# Patient Record
Sex: FEMALE | Race: WHITE
[De-identification: names, ages, dates, MRNs, and addresses within clinical notes are randomized per-mention and may not be internally consistent; named-entity substitution may affect disease eponyms.]

---

## 2017-11-27 ENCOUNTER — HOSPITAL ENCOUNTER (INPATIENT)
Dept: HOSPITAL 88 - ER | Age: 67
LOS: 4 days | Discharge: HOME | DRG: 69 | End: 2017-12-01
Attending: INTERNAL MEDICINE | Admitting: INTERNAL MEDICINE
Payer: MEDICARE

## 2017-11-27 VITALS — WEIGHT: 159.5 LBS | BODY MASS INDEX: 27.23 KG/M2 | HEIGHT: 64 IN

## 2017-11-27 DIAGNOSIS — G45.9: Primary | ICD-10-CM

## 2017-11-27 DIAGNOSIS — G45.3: ICD-10-CM

## 2017-11-27 DIAGNOSIS — N30.00: ICD-10-CM

## 2017-11-27 DIAGNOSIS — I25.10: ICD-10-CM

## 2017-11-27 DIAGNOSIS — Z86.73: ICD-10-CM

## 2017-11-27 DIAGNOSIS — E78.5: ICD-10-CM

## 2017-11-27 DIAGNOSIS — Z79.01: ICD-10-CM

## 2017-11-27 DIAGNOSIS — I10: ICD-10-CM

## 2017-11-27 DIAGNOSIS — I48.91: ICD-10-CM

## 2017-11-27 LAB
ALBUMIN SERPL-MCNC: 4.1 G/DL (ref 3.5–5)
ALBUMIN/GLOB SERPL: 1.1 {RATIO} (ref 0.8–2)
ALP SERPL-CCNC: 57 IU/L (ref 40–150)
ALT SERPL-CCNC: 22 IU/L (ref 0–55)
ANION GAP SERPL CALC-SCNC: 12.4 MMOL/L (ref 8–16)
BACTERIA URNS QL MICRO: (no result) /HPF
BASOPHILS # BLD AUTO: 0.1 10*3/UL (ref 0–0.1)
BASOPHILS NFR BLD AUTO: 0.8 % (ref 0–1)
BILIRUB UR QL: NEGATIVE
BUN SERPL-MCNC: 14 MG/DL (ref 7–26)
BUN/CREAT SERPL: 14 (ref 6–25)
CALCIUM SERPL-MCNC: 9.6 MG/DL (ref 8.4–10.2)
CHLORIDE SERPL-SCNC: 102 MMOL/L (ref 98–107)
CK MB SERPL-MCNC: 1.2 NG/ML (ref 0–5)
CK SERPL-CCNC: 83 IU/L (ref 29–168)
CLARITY UR: CLEAR
CO2 SERPL-SCNC: 28 MMOL/L (ref 22–29)
COLOR UR: YELLOW
DEPRECATED APTT PLAS QN: 29.4 SECONDS (ref 23.8–35.5)
DEPRECATED INR PLAS: 1.67
DEPRECATED NEUTROPHILS # BLD AUTO: 4.6 10*3/UL (ref 2.1–6.9)
DEPRECATED RBC URNS MANUAL-ACNC: (no result) /HPF (ref 0–5)
EGFRCR SERPLBLD CKD-EPI 2021: 57 ML/MIN (ref 60–?)
EOSINOPHIL # BLD AUTO: 0.5 10*3/UL (ref 0–0.4)
EOSINOPHIL NFR BLD AUTO: 6.2 % (ref 0–6)
EPI CELLS URNS QL MICRO: (no result) /LPF
ERYTHROCYTE [DISTWIDTH] IN CORD BLOOD: 13.6 % (ref 11.7–14.4)
GLOBULIN PLAS-MCNC: 3.6 G/DL (ref 2.3–3.5)
GLUCOSE SERPLBLD-MCNC: 104 MG/DL (ref 74–118)
HCT VFR BLD AUTO: 41.3 % (ref 34.2–44.1)
HGB BLD-MCNC: 13.8 G/DL (ref 12–16)
KETONES UR QL STRIP.AUTO: NEGATIVE
LEUKOCYTE ESTERASE UR QL STRIP.AUTO: (no result)
LYMPHOCYTES # BLD: 2.3 10*3/UL (ref 1–3.2)
LYMPHOCYTES NFR BLD AUTO: 27.3 % (ref 18–39.1)
MCH RBC QN AUTO: 32.1 PG (ref 28–32)
MCHC RBC AUTO-ENTMCNC: 33.4 G/DL (ref 31–35)
MCV RBC AUTO: 96 FL (ref 81–99)
MONOCYTES # BLD AUTO: 0.9 10*3/UL (ref 0.2–0.8)
MONOCYTES NFR BLD AUTO: 10.4 % (ref 4.4–11.3)
NEUTS SEG NFR BLD AUTO: 54.3 % (ref 38.7–80)
NITRITE UR QL STRIP.AUTO: NEGATIVE
PLATELET # BLD AUTO: 162 X10E3/UL (ref 140–360)
POTASSIUM SERPL-SCNC: 3.4 MMOL/L (ref 3.5–5.1)
PROT UR QL STRIP.AUTO: (no result)
PROTHROMBIN TIME: 20.6 SECONDS (ref 11.9–14.5)
RBC # BLD AUTO: 4.3 X10E6/UL (ref 3.6–5.1)
SODIUM SERPL-SCNC: 139 MMOL/L (ref 136–145)
SP GR UR STRIP: 1.01 (ref 1.01–1.02)
TROPONIN I SERPL DL<=0.01 NG/ML-MCNC: 0 NG/ML (ref 0–0.3)
UROBILINOGEN UR STRIP-MCNC: 0.2 MG/DL (ref 0.2–1)

## 2017-11-27 PROCEDURE — 71010: CPT

## 2017-11-27 PROCEDURE — 82553 CREATINE MB FRACTION: CPT

## 2017-11-27 PROCEDURE — 93880 EXTRACRANIAL BILAT STUDY: CPT

## 2017-11-27 PROCEDURE — 80053 COMPREHEN METABOLIC PANEL: CPT

## 2017-11-27 PROCEDURE — 97139 UNLISTED THERAPEUTIC PX: CPT

## 2017-11-27 PROCEDURE — 93005 ELECTROCARDIOGRAM TRACING: CPT

## 2017-11-27 PROCEDURE — 70450 CT HEAD/BRAIN W/O DYE: CPT

## 2017-11-27 PROCEDURE — 85025 COMPLETE CBC W/AUTO DIFF WBC: CPT

## 2017-11-27 PROCEDURE — 84484 ASSAY OF TROPONIN QUANT: CPT

## 2017-11-27 PROCEDURE — 77002 NEEDLE LOCALIZATION BY XRAY: CPT

## 2017-11-27 PROCEDURE — 87086 URINE CULTURE/COLONY COUNT: CPT

## 2017-11-27 PROCEDURE — 80061 LIPID PANEL: CPT

## 2017-11-27 PROCEDURE — 99284 EMERGENCY DEPT VISIT MOD MDM: CPT

## 2017-11-27 PROCEDURE — 85730 THROMBOPLASTIN TIME PARTIAL: CPT

## 2017-11-27 PROCEDURE — 36415 COLL VENOUS BLD VENIPUNCTURE: CPT

## 2017-11-27 PROCEDURE — 85610 PROTHROMBIN TIME: CPT

## 2017-11-27 PROCEDURE — 93458 L HRT ARTERY/VENTRICLE ANGIO: CPT

## 2017-11-27 PROCEDURE — 70551 MRI BRAIN STEM W/O DYE: CPT

## 2017-11-27 PROCEDURE — 82550 ASSAY OF CK (CPK): CPT

## 2017-11-27 PROCEDURE — 36140 INTRO NDL ICATH UPR/LXTR ART: CPT

## 2017-11-27 PROCEDURE — 81001 URINALYSIS AUTO W/SCOPE: CPT

## 2017-11-27 NOTE — DIAGNOSTIC IMAGING REPORT
EXAMINATION: Head CT without contrast.  





HISTORY:Possible stroke, weakness.



COMPARISON:None.

TECHNIQUE: Multidetector axial images were obtained from the foramen magnum to

the vertex without contrast. The images were reconstructed using brain and bone

algorithms.  Thin section brain images were reformatted into coronal and

sagittal planes.  

Intravenous contrast: None  



IMAGE QUALITY: Acceptable.



FINDINGS:

    Skull/scalp: No abnormality.

    Parenchyma: Focal hypodensity in right perirolandic region that extends to

the right parietal centrum semiovale and corona radiata as well as right

periatrial region represents age indeterminate vascular insult.

Nonspecific few, scattered supratentorial white matter hypodensity are likely

related to small vessel ischemic changes.

No acute hemorrhage or mass.

    Arteries: Atherosclerotic calcification in bilateral carotid siphon

    Dural sinuses: No abnormal density suggestive of thrombosis. 

    Ventricles: No hydrocephalus or displacement.

    Extra-axial spaces: No abnormal density.  

    Brain volume: Normal for age.

    Craniocervical junction: No mass, Chiari malformation, or basilar

invagination.

    Sella: No mass. 

    Paranasal/mastoid sinuses: Moderate mucosal thickening in bilateral ethmoid

sinuses.



IMPRESSION:

1.  Age indeterminate vascular insult in right perirolandic region that extends

to the parietal centrum semiovale and corona radiata.



2.  Mild supratentorial white matter microvascular ischemic changes.



Signed by: Dr. Victorina Teixeira M.D. on 11/27/2017 9:30 PM

## 2017-11-27 NOTE — DIAGNOSTIC IMAGING REPORT
EXAMINATION:  CHEST SINGLE (PORTABLE)    



INDICATION:           Weakness 



COMPARISON:  9/10/2015

     

FINDINGS:

TUBES and LINES:  None.



LUNGS:  Lungs are well inflated.  Lungs are clear.   There is no evidence of

pneumonia or pulmonary edema.



PLEURA:  No pleural effusion or pneumothorax.



HEART AND MEDIASTINUM:  The cardiomediastinal silhouette is unremarkable. There

are atherosclerotic calcifications within the aorta.



BONES AND SOFT TISSUES:  No acute osseous lesion. Lower cervical spine anterior

fusion. Soft tissues are remarkable for bilateral surgical clips.



UPPER ABDOMEN: No free air under the diaphragm.    



IMPRESSION: 

No acute thoracic abnormality.





Signed by: Dr. Kenn Medeiros M.D. on 11/27/2017 9:28 PM

## 2017-11-28 VITALS — SYSTOLIC BLOOD PRESSURE: 150 MMHG | DIASTOLIC BLOOD PRESSURE: 70 MMHG

## 2017-11-28 VITALS — SYSTOLIC BLOOD PRESSURE: 150 MMHG | DIASTOLIC BLOOD PRESSURE: 72 MMHG

## 2017-11-28 VITALS — SYSTOLIC BLOOD PRESSURE: 152 MMHG | DIASTOLIC BLOOD PRESSURE: 73 MMHG

## 2017-11-28 VITALS — SYSTOLIC BLOOD PRESSURE: 155 MMHG | DIASTOLIC BLOOD PRESSURE: 75 MMHG

## 2017-11-28 VITALS — DIASTOLIC BLOOD PRESSURE: 75 MMHG | SYSTOLIC BLOOD PRESSURE: 169 MMHG

## 2017-11-28 VITALS — SYSTOLIC BLOOD PRESSURE: 131 MMHG | DIASTOLIC BLOOD PRESSURE: 68 MMHG

## 2017-11-28 RX ADMIN — ASPIRIN 81 MG CHEWABLE TABLET SCH MG: 81 TABLET CHEWABLE at 08:26

## 2017-11-28 RX ADMIN — Medication SCH MG: at 08:26

## 2017-11-28 RX ADMIN — SODIUM CHLORIDE SCH MLS/HR: 9 INJECTION, SOLUTION INTRAVENOUS at 08:26

## 2017-11-28 RX ADMIN — METOPROLOL TARTRATE SCH MG: 50 TABLET, FILM COATED ORAL at 08:27

## 2017-11-28 RX ADMIN — MONTELUKAST SODIUM SCH MG: 10 TABLET, FILM COATED ORAL at 08:27

## 2017-11-28 RX ADMIN — SODIUM CHLORIDE SCH MLS/HR: 9 INJECTION, SOLUTION INTRAVENOUS at 00:34

## 2017-11-28 RX ADMIN — SODIUM CHLORIDE SCH MLS/HR: 9 INJECTION, SOLUTION INTRAVENOUS at 08:52

## 2017-11-28 RX ADMIN — LOSARTAN POTASSIUM SCH MG: 25 TABLET, FILM COATED ORAL at 08:26

## 2017-11-28 RX ADMIN — OXYBUTYNIN CHLORIDE SCH MG: 5 TABLET, FILM COATED, EXTENDED RELEASE ORAL at 08:27

## 2017-11-28 RX ADMIN — METOPROLOL TARTRATE SCH MG: 50 TABLET, FILM COATED ORAL at 16:47

## 2017-11-28 RX ADMIN — PANTOPRAZOLE SODIUM SCH MG: 40 TABLET, DELAYED RELEASE ORAL at 08:27

## 2017-11-28 RX ADMIN — SIMVASTATIN SCH MG: 20 TABLET, FILM COATED ORAL at 21:41

## 2017-11-28 NOTE — DIAGNOSTIC IMAGING REPORT
EXAMINATION: MRI of the brain without contrast.



HISTORY: Evaluate for stroke/TIA, weakness

COMPARISON: Head CT on 11/27/2017

TECHNIQUE: Sagittal T2; axial DWI, T2, FLAIR, T1-IR, T2 gradient echo; coronal

FLAIR. 

IMAGE QUALITY:  Adequate.





FINDINGS:

     Parenchyma: 

1.  Cavitating encephalomalacia in the right parietal corona

radiata-periventricular white matter, with compensatory dilatation of the right

lateral ventricle, likely sequela from remote insult such as infarct, infection

or trauma, correlate with past medical history.

2.   Scattered and mildly confluent periventricular white matter T2

hyperintense foci as well as in the right shayy, most likely nonspecific mild

chronic microvascular ischemic changes.

3.  No mass, hemorrhage, acute or chronic infarcts.



     Skull: Unremarkable.       

     Vessels: Expected flow voids present in the major arteries and dural

sinuses.

     Extra-axial spaces: No abnormal signal intensity or mass effect.

     Brain volume: Within normal limits for age.

     Ventricles: No hydrocephalus or displacement.

     Foramen magnum: Unremarkable.  

     Sella: Unremarkable.  

     Paranasal / mastoid sinuses: Mild mucosal inflammatory thickening of the

bilateral ethmoidal and maxillary sinuses.



IMPRESSION:



1.  No acute infarct.



2.  Right parietal encephalomalacia, likely sequela from remote insult.



3.  Mild chronic microvascular ischemic changes



Signed by: Dr. Meli Arreaga M.D. on 11/28/2017 1:53 PM

## 2017-11-28 NOTE — CONSULTATION
DATE OF CONSULTATION:  November 28, 2017, at 2:30 in the afternoon.



NEUROLOGICAL CONSULTATION



A patient of Dr. Carlos Lutz.



REASON FOR CONSULTATION:  TIA.



HISTORY OF PRESENT ILLNESS:  This is a 67-year-old female who has been 

stable until yesterday. They said in the evening they were sitting _______, 

eating. She was feeling well but then suddenly started feeling like 

generalized weak, like dizzy, like lightheaded and started having some 

blurry vision. All this got worse and she said she could not see anything 

and she felt like she was going to pass out. The whole episode lasted for 

about 15 minutes. She was brought to the hospital emergency room for 

further evaluation and management. 



At the time of this spell, she denies any headache. She denies any 

swallowing difficulty. Also the  says her speech was somewhat 

slurred.



She denied any chest pain, palpitation. No focal paresthesia, no focal 

weakness. Just generalized weakness.



PAST HISTORY:  Hypertension. She had had previous history of stroke, a 

history of atrial fibrillation, coronary artery disease.



SURGERY:  She has a hysterectomy and double mastectomies.



ALLERGIES:  NONE KNOWN. 



SOCIAL HISTORY:  Apparently she does not smoke or drink.



LIST OF MEDICATION SHE HAS BEEN TAKING:  At home she has been taking 

simvastatin 10 mg a day. She is taking Tracey aspirin 81 mg a day. 

Oxybutynin 5 mg daily. Metoprolol tartrate 50 mg a day. Losartan 25 mg a 

day. Flagyl and pantoprazole. The patient has been taking warfarin 5 mg a 

day.



FAMILY HISTORY:  Noncontributory.



REVIEW OF SYSTEMS:  At the present time, all 12 steps negative except what 

is described above.



GENERAL PHYSICAL EXAMINATION

VITAL SIGNS:  Blood pressure is 150/72, pulse 66, temperature is 96.

GENERAL:  She is feeling well. No chest pain, no palpitation, no visual 

disturbance, no headaches, no focal weakness, no focal paresthesia. Patient 

states that in the past she had a stroke several years ago, and that is 

when she started having atrial fibrillation. 

LUNGS:  Clear to auscultation.

HEART:  Regular sinus rhythm. There was no murmur.

ABDOMEN:  Soft. No tenderness. No organomegaly.

MUSCULOSKELETAL:  Lower extremities:  No edema, no cyanosis, no clubbing. 

No back or neck pain.

NEUROLOGIC

Mental status:  At the time of this examination, she is fully alert. She is 

oriented x3. Speech is clear. No dysarthria. No dysphagia. 

Cranial nerves:  Pupils were both equal and reactive. External ocular 

movements were full. No nystagmus. Visual field on confrontation was 

grossly normal. No facial weakness. Tongue protrudes midline. Palatal 

movements normal.

Motor power:  Patient is able to elevate each leg against gravity 

approximately 80 degrees without any difficulty. On the upper extremities, 

there is no weakness in either proximal or distal muscles of both upper 

extremities and both lower extremities also. Flexion of the hip is 5/5. 

Flexion and extension of the knees 5/5. Dorsiflexion of the ankles 5/5. 

Plantarflexion is 5/5. Dorsal extension 5/5. 

Deep tendon reflexes:  Triceps, biceps, radials 1+.  Knee jerks 1+. Ankle 

jerks were absent bilaterally. Plantar stimulation is down bilaterally.

Coordination:  Finger-to-nose is normal.

GAIT:  Deferred.

NECK:  Supple. Normal range of motion without discomfort. Carotid 

pulsations were present bilaterally. There were no carotid bruits, neither 

subclavian bruits bilaterally.



LABORATORY WORKUP:  CBC shows WBC 8400 with a hemoglobin 13.8, hematocrit 

41.3, platelets 162,000. Chemistry:  Sodium 139, potassium 3.4, BUN 14, 

creatinine 0.97, estimated GFR 57, calcium 9.6, glucose 104. Liver enzymes 

are all normal. Troponin is normal.



Urinalysis:  WBC 11-20, protein trace, blood 2+.



Urine culture is pending.



IMPRESSION

1. Acute transient ischemic attack (bilateral amaurosis fugax).

2. Hypertension.

3. Acute cystitis.



RECOMMENDATION:  I reviewed the films for the MRI of the brain and agree 

with the (1) _______ right parietal encephalomalacia probably from previous 

stroke that she mentioned. (2) There is chronic small-vessel disease, 

nothing acute.





Patient is scheduled to have a cardiac cath by Dr. Clements. We will wait for 

those results and will discuss with the patient and with the attending and 

will follow the patient closely.



 





DD:  11/28/2017 15:15

DT:  11/28/2017 17:34

Job#:  [id number]

## 2017-11-28 NOTE — CONSULTATION
DATE OF CONSULTATION:  November 28, 2017 



CARDIOLOGY CONSULTATION



CHIEF COMPLAINT:  Is dizziness.



HISTORY OF PRESENT ILLNESS:  Ms. Bobo is a 67-year-old female with a 

history of atrial fibrillation on warfarin.  She comes in with altered 

mental status and disorientation along with slurred speech which has 

recovered completely.  Her INR at the time of admission was 1.67 and is 

subtherapeutic.



Prior to this admission, she had a stress test done at Dr. Carlos Lutz's 

office which showed some mild inferior ischemia.  Her cardiac enzymes at 

the time of admission currently are normal. 



PAST MEDICAL HISTORY:  As listed above.



SOCIAL HISTORY:  Patient does not smoke or drink.



DRUG ALLERGIES:  LEVOFLOXACIN, MORPHINE.



REVIEW OF SYSTEMS:  Negative except as dictated in the history of present 

illness.

 

PHYSICAL EXAMINATION:  

VITALS:  Afebrile, heart rate 66, blood pressure 150/72, O2 sat 98%. 

CARDIOVASCULAR:  Regular rhythm.  No murmurs or gallops. 

LUNGS:  Clear to auscultation bilaterally. 

ABDOMEN:  Soft.  Bowel sounds heard adequately. 

NEUROLOGIC:  Nonfocal. 





ASSESSMENT:  

1. Transient ischemic attack. 

2. Coronary artery disease with abnormal stress test.



RECOMMENDATION:  MRI of the brain reveals no evidence of acute infarct.  

The patient's warfarin is subtherapeutic.  I had an extended discussion 

with her about maintaining subtherapeutic levels of warfarin.  

Additionally, she has had an abnormal stress test.  Cardiac cath is 

indicated.  This will be performed once her INR is less than 1.3.



I thank Dr. Lutz for this consultation.



 





DD:  11/28/2017 15:40

DT:  11/28/2017 16:16

Job#:  [id number]

## 2017-11-29 VITALS — SYSTOLIC BLOOD PRESSURE: 131 MMHG | DIASTOLIC BLOOD PRESSURE: 60 MMHG

## 2017-11-29 VITALS — SYSTOLIC BLOOD PRESSURE: 189 MMHG | DIASTOLIC BLOOD PRESSURE: 91 MMHG

## 2017-11-29 VITALS — SYSTOLIC BLOOD PRESSURE: 164 MMHG | DIASTOLIC BLOOD PRESSURE: 77 MMHG

## 2017-11-29 VITALS — DIASTOLIC BLOOD PRESSURE: 77 MMHG | SYSTOLIC BLOOD PRESSURE: 159 MMHG

## 2017-11-29 VITALS — DIASTOLIC BLOOD PRESSURE: 76 MMHG | SYSTOLIC BLOOD PRESSURE: 160 MMHG

## 2017-11-29 VITALS — SYSTOLIC BLOOD PRESSURE: 155 MMHG | DIASTOLIC BLOOD PRESSURE: 69 MMHG

## 2017-11-29 LAB
ALBUMIN SERPL-MCNC: 3.4 G/DL (ref 3.5–5)
ALBUMIN/GLOB SERPL: 1.1 {RATIO} (ref 0.8–2)
ALP SERPL-CCNC: 55 IU/L (ref 40–150)
ALT SERPL-CCNC: 19 IU/L (ref 0–55)
ANION GAP SERPL CALC-SCNC: 11.6 MMOL/L (ref 8–16)
BASOPHILS # BLD AUTO: 0 10*3/UL (ref 0–0.1)
BASOPHILS NFR BLD AUTO: 0.6 % (ref 0–1)
BUN SERPL-MCNC: 9 MG/DL (ref 7–26)
BUN/CREAT SERPL: 15 (ref 6–25)
CALCIUM SERPL-MCNC: 9.3 MG/DL (ref 8.4–10.2)
CHLORIDE SERPL-SCNC: 113 MMOL/L (ref 98–107)
CHOLEST SERPL-MCNC: 170 MD/DL (ref 0–199)
CHOLEST/HDLC SERPL: 2.8 {RATIO} (ref 3–3.6)
CO2 SERPL-SCNC: 22 MMOL/L (ref 22–29)
DEPRECATED INR PLAS: 1.42
DEPRECATED NEUTROPHILS # BLD AUTO: 3.7 10*3/UL (ref 2.1–6.9)
EGFRCR SERPLBLD CKD-EPI 2021: > 60 ML/MIN (ref 60–?)
EOSINOPHIL # BLD AUTO: 0.5 10*3/UL (ref 0–0.4)
EOSINOPHIL NFR BLD AUTO: 6.8 % (ref 0–6)
ERYTHROCYTE [DISTWIDTH] IN CORD BLOOD: 13.6 % (ref 11.7–14.4)
GLOBULIN PLAS-MCNC: 3.1 G/DL (ref 2.3–3.5)
GLUCOSE SERPLBLD-MCNC: 103 MG/DL (ref 74–118)
HCT VFR BLD AUTO: 39.2 % (ref 34.2–44.1)
HDLC SERPL-MSCNC: 60 MG/DL (ref 40–60)
HGB BLD-MCNC: 13 G/DL (ref 12–16)
LDLC SERPL CALC-MCNC: 97 MG/DL (ref 60–130)
LYMPHOCYTES # BLD: 1.7 10*3/UL (ref 1–3.2)
LYMPHOCYTES NFR BLD AUTO: 25.2 % (ref 18–39.1)
MCH RBC QN AUTO: 31.6 PG (ref 28–32)
MCHC RBC AUTO-ENTMCNC: 33.2 G/DL (ref 31–35)
MCV RBC AUTO: 95.4 FL (ref 81–99)
MONOCYTES # BLD AUTO: 0.7 10*3/UL (ref 0.2–0.8)
MONOCYTES NFR BLD AUTO: 10.6 % (ref 4.4–11.3)
NEUTS SEG NFR BLD AUTO: 56.2 % (ref 38.7–80)
PLATELET # BLD AUTO: 139 X10E3/UL (ref 140–360)
POTASSIUM SERPL-SCNC: 3.6 MMOL/L (ref 3.5–5.1)
PROTHROMBIN TIME: 18.1 SECONDS (ref 11.9–14.5)
RBC # BLD AUTO: 4.11 X10E6/UL (ref 3.6–5.1)
SODIUM SERPL-SCNC: 143 MMOL/L (ref 136–145)
TRIGL SERPL-MCNC: 64 MG/DL (ref 0–149)

## 2017-11-29 RX ADMIN — MONTELUKAST SODIUM SCH MG: 10 TABLET, FILM COATED ORAL at 09:00

## 2017-11-29 RX ADMIN — OXYBUTYNIN CHLORIDE SCH MG: 5 TABLET, FILM COATED, EXTENDED RELEASE ORAL at 09:00

## 2017-11-29 RX ADMIN — PANTOPRAZOLE SODIUM SCH MG: 40 TABLET, DELAYED RELEASE ORAL at 09:00

## 2017-11-29 RX ADMIN — SIMVASTATIN SCH MG: 20 TABLET, FILM COATED ORAL at 21:55

## 2017-11-29 RX ADMIN — ASPIRIN 81 MG CHEWABLE TABLET SCH MG: 81 TABLET CHEWABLE at 09:00

## 2017-11-29 RX ADMIN — METOPROLOL TARTRATE SCH MG: 50 TABLET, FILM COATED ORAL at 08:05

## 2017-11-29 RX ADMIN — LOSARTAN POTASSIUM SCH MG: 25 TABLET, FILM COATED ORAL at 08:05

## 2017-11-29 RX ADMIN — SODIUM CHLORIDE SCH MLS/HR: 9 INJECTION, SOLUTION INTRAVENOUS at 05:15

## 2017-11-29 RX ADMIN — Medication SCH MG: at 09:00

## 2017-11-29 RX ADMIN — SODIUM CHLORIDE SCH MLS/HR: 9 INJECTION, SOLUTION INTRAVENOUS at 08:31

## 2017-11-29 RX ADMIN — METOPROLOL TARTRATE SCH MG: 50 TABLET, FILM COATED ORAL at 16:46

## 2017-11-29 NOTE — PROGRESS NOTE
DATE:  November 29, 2017 



CARDIOLOGY PROGRESS NOTE



SUBJECTIVE:  Patient denies chest pain or shortness of breath.  

Unfortunately, her INR remains elevated.   



OBJECTIVE

VITAL SIGNS:  Temperature 96.9 degrees, pulse 66, respiratory rate 20, 

blood pressure 191/86, oxygen saturation 100% on room air.  

GENERAL:  Awake, alert, in no acute distress.

LUNGS:  Clear to auscultation bilaterally. No wheezes or crackles. 

CARDIOVASCULAR:  Normal rate, regular rhythm. No murmur.  Normal S1 and S2. 

ABDOMEN:  Soft, nontender.

EXTREMITIES:  No edema. 



CARDIAC MEDICATIONS

Metoprolol tartrate 50 mg p.o. b.i.d. 

Losartan 100 mg p.o. daily.  

Simvastatin 10 mg p.o. at bedtime.

Aspirin 81 mg p.o. daily. 



LABS:  WBC 6.6, hemoglobin 13, hematocrit 39.2, platelets 139.  Sodium 143, 

potassium 3.6, chloride 113, CO2 of 22, BUN 9, creatinine 0.6.  

Triglycerides 64, cholesterol 170, LDL 97, HDL 60.  



TELEMETRY:  Normal sinus rhythm.  



IMPRESSION

1.  Transient ischemic attack.

2.  Coronary artery disease with abnormal stress test.

3.  Urinary tract infection.

4.  Hypertension with hyperlipidemia.  



RECOMMENDATIONS:  Patient's INR is currently too high for coronary 

angiography at this time.  N.p.o. after midnight was planned for coronary 

angiogram tomorrow.  In the meantime, continue current cardiac medications. 





Thank you for this consult. We will continue to follow.







DD:  11/29/2017 10:13

DT:  11/29/2017 10:40

Job#:  [id number] LOCKE







MTDD

## 2017-11-29 NOTE — HISTORY AND PHYSICAL
CHIEF COMPLAINT:

1. Dizziness.

2. Severe weakness.



HPI:  This is 67-year-old female with past medical history of hypertension; 

hyperlipidemia; coronary artery disease; atrial fibrillation, on Coumadin, 

was sitting at [facility], meeting with the , found to have dizziness, 

brief loss of vision on the left eye, no focal weakness, but has 

generalized weakness and the patient was not feeling good, so told  

to take her to the emergency room.  In the emergency room, found to have 

little abnormal on CAT scan.  Has some burning urination.  UA showed some 

kind of UTI.  No chest pain.  No shortness of breath.  No diarrhea.  No 

abdominal pain.  No nausea.  No vomiting.  No hematemesis.  No melena.  No 

seizures.  No focal weakness.  No palpitation.  No leg pain.  No leg 

swelling.



PAST MEDICAL HISTORY:

1. Hypertension.

2. Hyperlipidemia.

3. Atrial fibrillation.

4. Coronary artery disease.



PAST SURGICAL HISTORY:  Hysterectomy.



SOCIAL HISTORY:  Patient is .  Lives with .



HABITS:  Denies smoking.  Denies alcohol use.  Denies illicit drug use.



FAMILY HISTORY:  Noncontributory.



MEDICATIONS LIST:  Attached.



REVIEW OF SYSTEMS:

GENERAL:  Generalized fatigue and weakness.

HEENT:  No diplopia.  Has some blurry vision on the left eye.

CARDIOPULMONARY:  No chest pain.  No palpitation.  No shortness of breath.

ALIMENTARY SYSTEM:  No nausea.  No vomiting.

MUSCULOSKELETAL SYSTEM:  No joint pain.

CENTRAL NERVOUS SYSTEM:  No focal weakness.  No seizures.



PHYSICAL EXAMINATION:

GENERAL:  [age]-year-old female who is alert and oriented x3, in no 

gross distress.

VITAL SIGNS:  Temperature 98.2, pulse 82, respiratory rate 18, blood 

pressure 150/80.

HEENT:  Within normal limits.

NECK:  Supple.  No JVD, no carotid bruit.

LUNGS:  Clear to auscultation and percussion bilaterally.  No added sounds.

HEART:  S1 and S2.  Regular rate and rhythm.  No S3, no S4.  No murmur.

ABDOMEN:  Soft, nontender.  No guarding, no rigidity.

EXTREMITIES:  No pedal edema.  Peripheral pulses +1.

CNS:  Grossly nonfocal.



LABORATORY DATA:  Urine showed UTI.  CBC normal.  CMP normal.  CT of head 

showed some __________ infarct.  EKG, normal sinus rhythm, 70 per minute.



ASSESSMENT:

1. Possible transient ischemic attack.

2. Urinary tract infection.

3. Weakness.

4. Hypertension.

5. Hyperlipidemia.

6. Atherosclerotic heart disease.

7. Coronary artery disease.



PLAN:  Admit to telemetry.  Cardiology consult, Dr. Haddad.  Neuro 

consult, Dr. Ley.  Increase dose of Coumadin to 2 mg p.o. daily.  MRI 

of brain.  Carotid Doppler.  

Will discuss with cardiologist.  Discussed with patient, told condition and 

prognosis.







DD:  11/28/2017 23:02

DT:  11/29/2017 00:58

Job#:  [id number]

## 2017-11-30 VITALS — DIASTOLIC BLOOD PRESSURE: 79 MMHG | SYSTOLIC BLOOD PRESSURE: 164 MMHG

## 2017-11-30 VITALS — DIASTOLIC BLOOD PRESSURE: 76 MMHG | SYSTOLIC BLOOD PRESSURE: 167 MMHG

## 2017-11-30 VITALS — DIASTOLIC BLOOD PRESSURE: 73 MMHG | SYSTOLIC BLOOD PRESSURE: 154 MMHG

## 2017-11-30 VITALS — DIASTOLIC BLOOD PRESSURE: 81 MMHG | SYSTOLIC BLOOD PRESSURE: 129 MMHG

## 2017-11-30 VITALS — DIASTOLIC BLOOD PRESSURE: 67 MMHG | SYSTOLIC BLOOD PRESSURE: 153 MMHG

## 2017-11-30 LAB
DEPRECATED INR PLAS: 1.3
PROTHROMBIN TIME: 16.9 SECONDS (ref 11.9–14.5)

## 2017-11-30 PROCEDURE — 4A023N7 MEASUREMENT OF CARDIAC SAMPLING AND PRESSURE, LEFT HEART, PERCUTANEOUS APPROACH: ICD-10-PCS | Performed by: INTERNAL MEDICINE

## 2017-11-30 PROCEDURE — B2111ZZ FLUOROSCOPY OF MULTIPLE CORONARY ARTERIES USING LOW OSMOLAR CONTRAST: ICD-10-PCS | Performed by: INTERNAL MEDICINE

## 2017-11-30 PROCEDURE — B2151ZZ FLUOROSCOPY OF LEFT HEART USING LOW OSMOLAR CONTRAST: ICD-10-PCS | Performed by: INTERNAL MEDICINE

## 2017-11-30 RX ADMIN — LOSARTAN POTASSIUM SCH MG: 25 TABLET, FILM COATED ORAL at 09:00

## 2017-11-30 RX ADMIN — SIMVASTATIN SCH MG: 20 TABLET, FILM COATED ORAL at 20:47

## 2017-11-30 RX ADMIN — PANTOPRAZOLE SODIUM SCH MG: 40 TABLET, DELAYED RELEASE ORAL at 09:00

## 2017-11-30 RX ADMIN — SODIUM CHLORIDE SCH MLS/HR: 9 INJECTION, SOLUTION INTRAVENOUS at 09:00

## 2017-11-30 RX ADMIN — MONTELUKAST SODIUM SCH MG: 10 TABLET, FILM COATED ORAL at 09:00

## 2017-11-30 RX ADMIN — Medication SCH MG: at 09:00

## 2017-11-30 RX ADMIN — SODIUM CHLORIDE SCH MLS/HR: 9 INJECTION, SOLUTION INTRAVENOUS at 23:45

## 2017-11-30 RX ADMIN — METOPROLOL TARTRATE SCH MG: 50 TABLET, FILM COATED ORAL at 17:29

## 2017-11-30 RX ADMIN — METOPROLOL TARTRATE SCH MG: 50 TABLET, FILM COATED ORAL at 09:00

## 2017-11-30 RX ADMIN — ASPIRIN 81 MG CHEWABLE TABLET SCH MG: 81 TABLET CHEWABLE at 09:00

## 2017-11-30 RX ADMIN — OXYBUTYNIN CHLORIDE SCH MG: 5 TABLET, FILM COATED, EXTENDED RELEASE ORAL at 09:00

## 2017-11-30 RX ADMIN — SODIUM CHLORIDE SCH MLS/HR: 9 INJECTION, SOLUTION INTRAVENOUS at 14:30

## 2017-11-30 NOTE — OPERATIVE REPORT
DATE OF PROCEDURE:  November 30, 2017

 

INDICATIONS:  Coronary artery disease.  Abnormal stress test.



PROCEDURES PERFORMED:  Left heart catheterization, selective coronary 

angiography.



COMPLICATIONS:  None.



RECOMMENDATIONS:  Medical therapy for atrial fibrillation.



Access was obtained in the right femoral artery.  A 6-Brazilian sheath was 

placed.  Diagnostic coronary angiogram revealed mild diffuse coronary 

artery disease in the left coronary system, which was a dominant vessel, 

less than 20% luminal stenosis.  Right coronary artery was nondominant.  

Excellent flow.  No critical stenosis or occlusions were noted.



Right groin sheath was removed under manual pressure.  Patient transferred 

to the floor in stable condition.









DD:  11/30/2017 13:45

DT:  11/30/2017 14:48

Job#:  [id number]

## 2017-11-30 NOTE — PROGRESS NOTE
DATE:  November 30, 2017 



CARDIOLOGY PROGRESS NOTE



SUBJECTIVE:  Ms. Bobo underwent coronary angiography.  She has minimal 

coronary artery disease.



OBJECTIVE   

VITAL SIGNS:  Afebrile.  Heart rate 78.  Blood pressure is 140/72. 

CARDIOVASCULAR:  Regular rhythm.  No murmurs or gallops. 

LUNGS:  Clear to auscultation bilaterally. 

ABDOMEN:  Soft.  Bowel sounds are heard adequately. 



ASSESSMENT

1. Coronary artery disease with abnormal stress test.

2. Atrial fibrillation with transient ischemic attack. 



PLAN:  Resumption of anticoagulation with warfarin for target INR of 2 to 

3.  The patient has mild coronary artery disease.  At this point, she is in 

sinus rhythm.  She may be discharged with close followup with Dr. Lutz in 

the office with recheck of warfarin and INR.



I did discuss the options of direct oral anticoagulant.  Cherry remains an 

issue for the patient.  Further discussions as an outpatient.







DD:  11/30/2017 13:46

DT:  11/30/2017 15:02

Job#:  [id number]

## 2017-12-01 VITALS — DIASTOLIC BLOOD PRESSURE: 66 MMHG | SYSTOLIC BLOOD PRESSURE: 120 MMHG

## 2017-12-01 VITALS — DIASTOLIC BLOOD PRESSURE: 66 MMHG | SYSTOLIC BLOOD PRESSURE: 139 MMHG

## 2017-12-01 LAB
ALBUMIN SERPL-MCNC: 3.5 G/DL (ref 3.5–5)
ALBUMIN/GLOB SERPL: 1.1 {RATIO} (ref 0.8–2)
ALP SERPL-CCNC: 49 IU/L (ref 40–150)
ALT SERPL-CCNC: 16 IU/L (ref 0–55)
ANION GAP SERPL CALC-SCNC: 12.8 MMOL/L (ref 8–16)
BASOPHILS # BLD AUTO: 0.1 10*3/UL (ref 0–0.1)
BASOPHILS NFR BLD AUTO: 0.7 % (ref 0–1)
BUN SERPL-MCNC: 10 MG/DL (ref 7–26)
BUN/CREAT SERPL: 16 (ref 6–25)
CALCIUM SERPL-MCNC: 9 MG/DL (ref 8.4–10.2)
CHLORIDE SERPL-SCNC: 110 MMOL/L (ref 98–107)
CO2 SERPL-SCNC: 21 MMOL/L (ref 22–29)
DEPRECATED NEUTROPHILS # BLD AUTO: 3.8 10*3/UL (ref 2.1–6.9)
EGFRCR SERPLBLD CKD-EPI 2021: > 60 ML/MIN (ref 60–?)
EOSINOPHIL # BLD AUTO: 0.4 10*3/UL (ref 0–0.4)
EOSINOPHIL NFR BLD AUTO: 5.8 % (ref 0–6)
ERYTHROCYTE [DISTWIDTH] IN CORD BLOOD: 13.8 % (ref 11.7–14.4)
GLOBULIN PLAS-MCNC: 3.2 G/DL (ref 2.3–3.5)
GLUCOSE SERPLBLD-MCNC: 96 MG/DL (ref 74–118)
HCT VFR BLD AUTO: 39.8 % (ref 34.2–44.1)
HGB BLD-MCNC: 13.1 G/DL (ref 12–16)
LYMPHOCYTES # BLD: 1.9 10*3/UL (ref 1–3.2)
LYMPHOCYTES NFR BLD AUTO: 26.9 % (ref 18–39.1)
MCH RBC QN AUTO: 31.6 PG (ref 28–32)
MCHC RBC AUTO-ENTMCNC: 32.9 G/DL (ref 31–35)
MCV RBC AUTO: 95.9 FL (ref 81–99)
MONOCYTES # BLD AUTO: 0.8 10*3/UL (ref 0.2–0.8)
MONOCYTES NFR BLD AUTO: 11.6 % (ref 4.4–11.3)
NEUTS SEG NFR BLD AUTO: 54.2 % (ref 38.7–80)
PLATELET # BLD AUTO: 75 X10E3/UL (ref 140–360)
POTASSIUM SERPL-SCNC: 3.8 MMOL/L (ref 3.5–5.1)
RBC # BLD AUTO: 4.15 X10E6/UL (ref 3.6–5.1)
SODIUM SERPL-SCNC: 140 MMOL/L (ref 136–145)

## 2017-12-01 RX ADMIN — Medication SCH MG: at 09:15

## 2017-12-01 RX ADMIN — SODIUM CHLORIDE SCH MLS/HR: 9 INJECTION, SOLUTION INTRAVENOUS at 09:45

## 2017-12-01 RX ADMIN — OXYBUTYNIN CHLORIDE SCH MG: 5 TABLET, FILM COATED, EXTENDED RELEASE ORAL at 09:15

## 2017-12-01 RX ADMIN — ASPIRIN 81 MG CHEWABLE TABLET SCH MG: 81 TABLET CHEWABLE at 09:15

## 2017-12-01 RX ADMIN — METOPROLOL TARTRATE SCH MG: 50 TABLET, FILM COATED ORAL at 09:15

## 2017-12-01 RX ADMIN — SODIUM CHLORIDE SCH MLS/HR: 9 INJECTION, SOLUTION INTRAVENOUS at 09:15

## 2017-12-01 RX ADMIN — PANTOPRAZOLE SODIUM SCH MG: 40 TABLET, DELAYED RELEASE ORAL at 09:15

## 2017-12-01 RX ADMIN — LOSARTAN POTASSIUM SCH MG: 25 TABLET, FILM COATED ORAL at 09:15

## 2017-12-01 RX ADMIN — MONTELUKAST SODIUM SCH MG: 10 TABLET, FILM COATED ORAL at 09:15

## 2017-12-01 NOTE — PROGRESS NOTE
DATE:  December 01, 2017 



CARDIOLOGY PROGRESS NOTE



SUBJECTIVE:  Patient denies chest pain or shortness of breath.  



OBJECTIVE

VITAL SIGNS:  Temperature 97.6 degrees, pulse 55, respiratory rate 18, 

blood pressure 132/62, oxygen saturation 98% on room air.  

GENERAL:  Awake, alert, in no acute distress.

LUNGS:  Clear to auscultation bilaterally.  No wheezes or crackles. 

CARDIOVASCULAR:  Normal rate, regular rhythm.  No murmur.  Normal S1 and 

S2. 

ABDOMEN:  Soft, nontender.

EXTREMITIES:  No edema. 

RIGHT GROIN:  Without hematoma or bruit.



CARDIAC MEDICATIONS

Aspirin 81 mg p.o. daily.

Metoprolol tartrate 50 mg p.o. b.i.d. 

Losartan 100 mg p.o. daily.  

Simvastatin 10 mg p.o. at bedtime.



LABS:  WBC 7.06, hemoglobin 13.1, hematocrit 39.8, platelets 75.  Sodium 

140, potassium 3.8, chloride 110, CO2 21, BUN 10, creatinine 0.62.  



TELEMETRY:  Normal sinus rhythm.  



IMPRESSION

1.  Transient ischemic attack.

2.  Coronary artery disease with abnormal stress test.

3.  Urinary tract infection.

4.  Hypertension. 

5.  Hyperlipidemia.  



RECOMMENDATIONS:  Coronary angiogram revealed mild diffuse coronary artery 

disease with less than 20% luminal stenosis.  Start Eliquis.  Given the 

patient's difficulty with warfarin, will start Eliquis 5 mg p.o. b.i.d.  

Stop warfarin.  The patient was counseled on the risks and benefits of 

Eliquis including the lack of reversal agent.  The patient is agreeable to 

proceed.  Continue current cardiac medications otherwise. 



Thank you for this consult. We will continue to follow.  She has been 

instructed to follow up with Dr. Haddad in 1 week.







DD:  12/01/2017 11:11

DT:  12/01/2017 12:15

Job#:  [id number]

## 2018-11-05 LAB
ANION GAP SERPL CALC-SCNC: 13.1 MMOL/L (ref 8–16)
BASOPHILS # BLD AUTO: 0.1 10*3/UL (ref 0–0.1)
BASOPHILS NFR BLD AUTO: 0.6 % (ref 0–1)
BUN SERPL-MCNC: 13 MG/DL (ref 7–26)
BUN/CREAT SERPL: 18 (ref 6–25)
CALCIUM SERPL-MCNC: 9.6 MG/DL (ref 8.4–10.2)
CHLORIDE SERPL-SCNC: 104 MMOL/L (ref 98–107)
CO2 SERPL-SCNC: 24 MMOL/L (ref 22–29)
DEPRECATED INR PLAS: 0.87
DEPRECATED NEUTROPHILS # BLD AUTO: 4.6 10*3/UL (ref 2.1–6.9)
EGFRCR SERPLBLD CKD-EPI 2021: > 60 ML/MIN (ref 60–?)
EOSINOPHIL # BLD AUTO: 0.3 10*3/UL (ref 0–0.4)
EOSINOPHIL NFR BLD AUTO: 3.7 % (ref 0–6)
ERYTHROCYTE [DISTWIDTH] IN CORD BLOOD: 14.2 % (ref 11.7–14.4)
GLUCOSE SERPLBLD-MCNC: 85 MG/DL (ref 74–118)
HCT VFR BLD AUTO: 38.4 % (ref 34.2–44.1)
HGB BLD-MCNC: 12 G/DL (ref 12–16)
LYMPHOCYTES # BLD: 2.6 10*3/UL (ref 1–3.2)
LYMPHOCYTES NFR BLD AUTO: 31.3 % (ref 18–39.1)
MCH RBC QN AUTO: 28.6 PG (ref 28–32)
MCHC RBC AUTO-ENTMCNC: 31.3 G/DL (ref 31–35)
MCV RBC AUTO: 91.6 FL (ref 81–99)
MONOCYTES # BLD AUTO: 0.6 10*3/UL (ref 0.2–0.8)
MONOCYTES NFR BLD AUTO: 7.3 % (ref 4.4–11.3)
NEUTS SEG NFR BLD AUTO: 56.9 % (ref 38.7–80)
PLATELET # BLD AUTO: 121 X10E3/UL (ref 140–360)
POTASSIUM SERPL-SCNC: 4.1 MMOL/L (ref 3.5–5.1)
PROTHROMBIN TIME: 12.7 SECONDS (ref 11.9–14.5)
RBC # BLD AUTO: 4.19 X10E6/UL (ref 3.6–5.1)
SODIUM SERPL-SCNC: 137 MMOL/L (ref 136–145)

## 2018-11-06 ENCOUNTER — HOSPITAL ENCOUNTER (OUTPATIENT)
Dept: HOSPITAL 88 - CATH LAB | Age: 68
Discharge: HOME | End: 2018-11-06
Attending: INTERNAL MEDICINE
Payer: MEDICARE

## 2018-11-06 VITALS — WEIGHT: 159 LBS | BODY MASS INDEX: 27.14 KG/M2 | HEIGHT: 64 IN

## 2018-11-06 VITALS — DIASTOLIC BLOOD PRESSURE: 86 MMHG | SYSTOLIC BLOOD PRESSURE: 197 MMHG

## 2018-11-06 DIAGNOSIS — I48.0: Primary | ICD-10-CM

## 2018-11-06 DIAGNOSIS — Z01.812: ICD-10-CM

## 2018-11-06 DIAGNOSIS — Z79.82: ICD-10-CM

## 2018-11-06 DIAGNOSIS — Z79.02: ICD-10-CM

## 2018-11-06 DIAGNOSIS — Z87.891: ICD-10-CM

## 2018-11-06 PROCEDURE — 33282: CPT

## 2018-11-06 PROCEDURE — 85610 PROTHROMBIN TIME: CPT

## 2018-11-06 PROCEDURE — 80048 BASIC METABOLIC PNL TOTAL CA: CPT

## 2018-11-06 PROCEDURE — 85025 COMPLETE CBC W/AUTO DIFF WBC: CPT

## 2018-11-06 PROCEDURE — 36415 COLL VENOUS BLD VENIPUNCTURE: CPT

## 2018-11-06 NOTE — XMS REPORT
Patient Summary Document

                             Created on: 2018



JULIANA BOLDEN

External Reference #: [id number]

: 1950

Sex: Female



Demographics







                          Address                   [address] [place] [address]  [address]

 

                          Home Phone                [phone]

 

                          Preferred Language        Unknown

 

                          Marital Status            Unknown

 

                          Quaker Affiliation     Unknown

 

                          Race                      Unknown

 

                                        Additional Race(s)  

 

                          Ethnic Group              Unknown





Author







                          Author                    [facility]

 

                          Address                   Unknown

 

                          Phone                     Unavailable







Care Team Providers







                    Care Team Member Name    Role                Phone

 

                    MARKUS CARMICHAEL    Unavailable         Unavailable







Problems

This patient has no known problems.



Allergies, Adverse Reactions, Alerts

This patient has no known allergies or adverse reactions.



Medications

This patient has no known medications.



Results







           Test Description    Test Time    Test Comments    Text Results    Atomic Results    Result

 Comments

 

                MRI BRAIN WO                                        [place] [address]      Patient Name: JULIANA BOLDEN   MR #: 
[id number]    : 1950 Age/Sex: 67/F  Acct #: [id number] Req #: [id number]
[id number]  Adm Physician: MARKUS CARMICHAEL MD    Ordered by: SAIDA GLASS MD  Report #: [id number]   Location: MED/SURG  Room/Bed: [facility]    
_____________________________________________
______________________________________________________    Procedure: [id number] 
MRI/MRI BRAIN WO  Exam Date:                             Exam Time:        
REPORT STATUS: Signed    EXAMINATION: MRI of the brain without contrast.      
HISTORY: Evaluate for stroke/TIA, weakness   COMPARISON: Head CT on 2017  
TECHNIQUE: Sagittal T2; axial DWI, T2, FLAIR, T1-IR, T2 gradient echo; coronal  
FLAIR.    IMAGE QUALITY:  Adequate.         FINDINGS:        Parenchyma:    1.  
Cavitating encephalomalacia in the right parietal corona   radiata-
periventricular white matter, with compensatory dilatation of the right   
lateral ventricle, likely sequela from remote insult such as infarct, infection 
 or trauma, correlate with past medical history.   2.   Scattered and mildly 
confluent periventricular white matter T2   hyperintense foci as well as in the 
right shayy, most likely nonspecific mild   chronic microvascular ischemic 
changes.   3.  No mass, hemorrhage, acute or chronic infarcts.           Skull: 
Unremarkable.               Vessels: Expected flow voids present in the major 
arteries and dural   sinuses.        Extra-axial spaces: No abnormal signal 
intensity or mass effect.        Brain volume: Within normal limits for age.    
   Ventricles: No hydrocephalus or displacement.        Foramen magnum: 
Unremarkable.          Sella: Unremarkable.          Paranasal / mastoid 
sinuses: Mild mucosal inflammatory thickening of the   bilateral ethmoidal and 
maxillary sinuses.      IMPRESSION:      1.  No acute infarct.      2.  Right 
parietal encephalomalacia, likely sequela from remote insult.      3.  Mild 
chronic microvascular ischemic changes      Signed by: Dr. Theo Arreaga M.D. on 
2017 1:53 PM        Dictated By: THEO ARREAGA MD  Electronically Signed By:
THEO ARREAGA MD on 17 1353  Transcribed By: ALISTAIR on 17 1353       
COPY TO:   SAIDA GLASS MD             

 

                CHEST SINGLE (PORTABLE)                                        [place] [address]      Patient Name: JULIANA BOLDEN 
 MR #: [id number]    : 1950 Age/Sex: 67/F  Acct #: [id number] Req #: 
[id number]  Adm Physician:     Ordered by: SAIDA GLASS MD  Report #: 
[id number]   Location: ER  Room/Bed:     
___________________________________________________________________________
________________________    Procedure: [id number] DX/CHEST SINGLE (PORTABLE)  
Exam Date: 17                            Exam Time: 2100       REPORT 
STATUS: Signed    EXAMINATION:  CHEST SINGLE (PORTABLE)          INDICATION:    
      Weakness       COMPARISON:  9/10/2015           FINDINGS:   TUBES and 
LINES:  None.      LUNGS:  Lungs are well inflated.  Lungs are clear.   There is
no evidence of   pneumonia or pulmonary edema.      PLEURA:  No pleural effusion
or pneumothorax.      HEART AND MEDIASTINUM:  The cardiomediastinal silhouette 
is unremarkable. There   are atherosclerotic calcifications within the aorta.   
  BONES AND SOFT TISSUES:  No acute osseous lesion. Lower cervical spine 
anterior   fusion. Soft tissues are remarkable for bilateral surgical clips.    
 UPPER ABDOMEN: No free air under the diaphragm.          IMPRESSION:    No 
acute thoracic abnormality.         Signed by: Dr. Kenn Medeiros M.D. on 
2017 9:28 PM        Dictated By: KENN LEHMAN MD  Electronically 
Signed By: KENN LEHMAN MD on 17  Transcribed By: ALISTAIR on 
17       COPY TO:   SAIDA GLASS MD             

 

                CT BRAIN WO                                         [place] [address]      Patient Name: JULIANA BOLDEN   MR #: 
[id number]    : 1950 Age/Sex: 67/F  Acct #: [id number] Req #: [id number]
[id number]  Adm Physician:     Ordered by: SAIDA GLASS MD  Report #: 
[id number]   Location: ER  Room/Bed:     
___________________________________________________________________________
________________________    Procedure: [id number] CT/CT BRAIN WO  Exam Date: 
17                            Exam Time:        REPORT STATUS: Signed 
  EXAMINATION: Head CT without contrast.           HISTORY:Possible stroke, 
weakness.      COMPARISON:None.   TECHNIQUE: Multidetector axial images were 
obtained from the foramen magnum to   the vertex without contrast. The images 
were reconstructed using brain and bone   algorithms.  Thin section brain images
were reformatted into coronal and   sagittal planes.     Intravenous contrast: 
None        IMAGE QUALITY: Acceptable.      FINDINGS:       Skull/scalp: No 
abnormality.       Parenchyma: Focal hypodensity in right perirolandic region 
that extends to   the right parietal centrum semiovale and corona radiata as 
well as right   periatrial region represents age indeterminate vascular insult. 
 Nonspecific few, scattered supratentorial white matter hypodensity are likely  
related to small vessel ischemic changes.   No acute hemorrhage or mass.       
Arteries: Atherosclerotic calcification in bilateral carotid siphon       Dural 
sinuses: No abnormal density suggestive of thrombosis.        Ventricles: No 
hydrocephalus or displacement.       Extra-axial spaces: No abnormal density.   
     Brain volume: Normal for age.       Craniocervical junction: No mass, 
Chiari malformation, or basilar   invagination.       Sella: No mass.        Pa
ranasal/mastoid sinuses: Moderate mucosal thickening in bilateral ethmoid   
sinuses.      IMPRESSION:   1.  Age indeterminate vascular insult in right 
perirolandic region that extends   to the parietal centrum semiovale and corona 
radiata.      2.  Mild supratentorial white matter microvascular ischemic 
changes.      Signed by: Dr. Victorina Teixeira M.D. on 2017 9:30 PM        
Dictated By: VICTORINA TEIXEIRA MD  Electronically Signed By: VICTORINA TEIXEIRA MD on
17  Transcribed By: ALISTAIR on 17       COPY TO:   
SAIDA GLASS MD

## 2018-11-07 NOTE — OPERATIVE REPORT
DATE OF PROCEDURE:  November 06, 2018 

 

PREPROCEDURE DIAGNOSIS:  Recurrent syncope of unknown etiology.



POSTPROCEDURE DIAGNOSIS:  Recurrent syncope of unknown etiology.



PROCEDURE PERFORMED:  Implantation of a loop recorder.



ESTIMATED BLOOD LOSS:  5 mL.



ANESTHESIA:  Local.



DESCRIPTION OF PROCEDURE:  After informed consent was obtained, the patient 

was brought to the electrophysiology laboratory in a fasting, nonsedated 

state.  The area over her chest was prepped and draped in the usual sterile 

fashion.  Local anesthesia was used with 1% lidocaine.  A 1-cm skin 

incision was made in the 4th intercostal space in the left parasternal 

area.  The loop recorder was implanted using the implantation kit without 

any issues.  The incision was closed using Vicryl and Dermabond.  The 

patient tolerated the procedure well.  The procedure was then completed.



IMPRESSION:  Successful implantation of a loop recorder.



PLAN

1. Routine postoperative monitoring on a telemetry bed.

2. Discharge home.  Follow up in 2 weeks.





DD:  11/07/2018 11:23

DT:  11/07/2018 14:54

Job#:  [id number]